# Patient Record
Sex: MALE | Race: BLACK OR AFRICAN AMERICAN | Employment: UNEMPLOYED | URBAN - METROPOLITAN AREA
[De-identification: names, ages, dates, MRNs, and addresses within clinical notes are randomized per-mention and may not be internally consistent; named-entity substitution may affect disease eponyms.]

---

## 2019-03-14 ENCOUNTER — HOSPITAL ENCOUNTER (INPATIENT)
Age: 24
LOS: 1 days | Discharge: LEFT BEFORE DISCHARGE | DRG: 881 | End: 2019-03-15
Attending: EMERGENCY MEDICINE | Admitting: PSYCHIATRY & NEUROLOGY
Payer: SELF-PAY

## 2019-03-14 DIAGNOSIS — R45.851 SUICIDAL INTENT: Primary | ICD-10-CM

## 2019-03-14 PROBLEM — F32.A DEPRESSION: Status: ACTIVE | Noted: 2019-03-14

## 2019-03-14 LAB
AMPHET UR QL SCN: NEGATIVE
ANION GAP SERPL CALC-SCNC: 10 MMOL/L (ref 5–15)
BARBITURATES UR QL SCN: NEGATIVE
BENZODIAZ UR QL: NEGATIVE
BUN SERPL-MCNC: 11 MG/DL (ref 6–20)
BUN/CREAT SERPL: 8 (ref 12–20)
CALCIUM SERPL-MCNC: 8.9 MG/DL (ref 8.5–10.1)
CANNABINOIDS UR QL SCN: NEGATIVE
CHLORIDE SERPL-SCNC: 104 MMOL/L (ref 97–108)
CO2 SERPL-SCNC: 28 MMOL/L (ref 21–32)
COCAINE UR QL SCN: NEGATIVE
CREAT SERPL-MCNC: 1.36 MG/DL (ref 0.7–1.3)
DRUG SCRN COMMENT,DRGCM: NORMAL
ETHANOL SERPL-MCNC: <10 MG/DL
GLUCOSE SERPL-MCNC: 104 MG/DL (ref 65–100)
METHADONE UR QL: NEGATIVE
OPIATES UR QL: NEGATIVE
PCP UR QL: NEGATIVE
POTASSIUM SERPL-SCNC: 3.1 MMOL/L (ref 3.5–5.1)
SODIUM SERPL-SCNC: 142 MMOL/L (ref 136–145)

## 2019-03-14 PROCEDURE — 74011250637 HC RX REV CODE- 250/637: Performed by: INTERNAL MEDICINE

## 2019-03-14 PROCEDURE — 80307 DRUG TEST PRSMV CHEM ANLYZR: CPT

## 2019-03-14 PROCEDURE — 36415 COLL VENOUS BLD VENIPUNCTURE: CPT

## 2019-03-14 PROCEDURE — 74011250637 HC RX REV CODE- 250/637: Performed by: PHYSICIAN ASSISTANT

## 2019-03-14 PROCEDURE — 99285 EMERGENCY DEPT VISIT HI MDM: CPT

## 2019-03-14 PROCEDURE — 65220000003 HC RM SEMIPRIVATE PSYCH

## 2019-03-14 PROCEDURE — 80048 BASIC METABOLIC PNL TOTAL CA: CPT

## 2019-03-14 RX ORDER — BENZTROPINE MESYLATE 2 MG/1
2 TABLET ORAL
Status: DISCONTINUED | OUTPATIENT
Start: 2019-03-14 | End: 2019-03-15 | Stop reason: HOSPADM

## 2019-03-14 RX ORDER — IBUPROFEN 400 MG/1
400 TABLET ORAL
Status: DISCONTINUED | OUTPATIENT
Start: 2019-03-14 | End: 2019-03-15 | Stop reason: HOSPADM

## 2019-03-14 RX ORDER — POTASSIUM CHLORIDE 750 MG/1
40 TABLET, FILM COATED, EXTENDED RELEASE ORAL
Status: COMPLETED | OUTPATIENT
Start: 2019-03-14 | End: 2019-03-14

## 2019-03-14 RX ORDER — LORAZEPAM 1 MG/1
1 TABLET ORAL
Status: DISCONTINUED | OUTPATIENT
Start: 2019-03-14 | End: 2019-03-15 | Stop reason: HOSPADM

## 2019-03-14 RX ORDER — ADHESIVE BANDAGE
30 BANDAGE TOPICAL DAILY PRN
Status: DISCONTINUED | OUTPATIENT
Start: 2019-03-14 | End: 2019-03-15 | Stop reason: HOSPADM

## 2019-03-14 RX ORDER — ZOLPIDEM TARTRATE 10 MG/1
10 TABLET ORAL
Status: DISCONTINUED | OUTPATIENT
Start: 2019-03-14 | End: 2019-03-15 | Stop reason: HOSPADM

## 2019-03-14 RX ORDER — OLANZAPINE 5 MG/1
5 TABLET ORAL
Status: DISCONTINUED | OUTPATIENT
Start: 2019-03-14 | End: 2019-03-15 | Stop reason: HOSPADM

## 2019-03-14 RX ORDER — LORAZEPAM 2 MG/ML
2 INJECTION INTRAMUSCULAR
Status: DISCONTINUED | OUTPATIENT
Start: 2019-03-14 | End: 2019-03-15 | Stop reason: HOSPADM

## 2019-03-14 RX ORDER — BENZTROPINE MESYLATE 1 MG/ML
2 INJECTION INTRAMUSCULAR; INTRAVENOUS
Status: DISCONTINUED | OUTPATIENT
Start: 2019-03-14 | End: 2019-03-15 | Stop reason: HOSPADM

## 2019-03-14 RX ORDER — IBUPROFEN 200 MG
1 TABLET ORAL
Status: DISCONTINUED | OUTPATIENT
Start: 2019-03-14 | End: 2019-03-15 | Stop reason: HOSPADM

## 2019-03-14 RX ORDER — ACETAMINOPHEN 325 MG/1
650 TABLET ORAL
Status: DISCONTINUED | OUTPATIENT
Start: 2019-03-14 | End: 2019-03-15 | Stop reason: HOSPADM

## 2019-03-14 RX ADMIN — POTASSIUM CHLORIDE 40 MEQ: 750 TABLET, EXTENDED RELEASE ORAL at 09:21

## 2019-03-14 RX ADMIN — POTASSIUM CHLORIDE 40 MEQ: 750 TABLET, FILM COATED, EXTENDED RELEASE ORAL at 21:54

## 2019-03-14 NOTE — H&P
History and Physical    Subjective:     Claude Solis. is a 26 y.o. male with no significnat past medical hx. Per ER documentation, pt has  PMHx significant for depression, presents via EMS to the hospital with cc of acute severe episode of SI x 1 day. Per EMS, pt reportedly called 911 this AM stating he was going to kill himself; no plan. Informed them where his body would be found. EMS searched house where call came from but did not find pt until another person called stating male was on bridge. Pt was soaked and appeared to have already jumped in the water once, attempting to drown himself. Pt was coaxed off the bridge and brought to ED. Pt endorses he has tried to kill himself before by jumping off a building but was not able to jump. PT states he will continue to attempt to kill himself until he is sucessful. Patient has not been taking his medications for 7 or 8 months. States he was admitted to [facility] in October for depression and a previous facility at an earlier time. Denies alcohol or substance abuse. Denies HI or hallucinations. No modifying factors. Pt denies any acute medical issues. Noted to have low potassium. Showing no signs of acute distress. Past Medical History:   Diagnosis Date    Depression       PSHx: none declared by pt. FHx: HTN    Social History     Tobacco Use    Smoking status: Never Smoker    Smokeless tobacco: Never Used   Substance Use Topics    Alcohol use: No       Prior to Admission medications    Not on File     No Known Allergies     Review of Systems:  Constitutional: negative  Eyes: negative  Ears, Nose, Mouth, Throat, and Face: negative  Respiratory: negative  Cardiovascular: negative  Gastrointestinal: negative  Genitourinary:negative  Integument/Breast: negative  Hematologic/Lymphatic: negative  Musculoskeletal:negative  Neurological: negative  Behavioral/Psychiatric: Depression.    Endocrine: negative  Allergic/Immunologic: negative     Objective: Intake and Output:    No intake/output data recorded. No intake/output data recorded. Physical Exam:   Visit Vitals  /66   Pulse 86   Temp 98.2 °F (36.8 °C)   Resp 16   Ht 6' 2\" (1.88 m)   Wt 113.4 kg (250 lb)   SpO2 100%   BMI 32.10 kg/m²     General:  Alert, cooperative, no distress, appears stated age. Head:  Normocephalic, without obvious abnormality, atraumatic. Eyes:  Conjunctivae/corneas clear. PERRL, EOMs intact. Ears:  Normal external ear canals both ears. Nose: Nares normal. Septum midline. Mucosa normal. No drainage or sinus tenderness. Throat: Lips, mucosa, and tongue normal. Teeth and gums normal.   Neck: Supple, symmetrical, trachea midline, no adenopathy, thyroid: no enlargement/tenderness/nodules. Back:   Symmetric, no curvature. ROM normal. No CVA tenderness. Lungs:   Clear to auscultation bilaterally. Chest wall:  No tenderness or deformity. Heart:  Regular rate and rhythm, S1, S2 normal, no murmur, click, rub or gallop. Abdomen:   Soft, non-tender. Bowel sounds normal. No masses,  No organomegaly. Extremities: Extremities normal, atraumatic, no cyanosis or edema. Pulses: Distal pulses intact. Skin: Skin color, texture, turgor normal. No rashes or lesions   Lymph nodes: No cervical or supraclavicular adenopathy. Neurologic: CNII-XII intact. Normal strength, sensation intact, reflexes 2/4 patellar region.         Data Review:   Recent Results (from the past 24 hour(s))   ETHYL ALCOHOL    Collection Time: 03/14/19  8:10 AM   Result Value Ref Range    ALCOHOL(ETHYL),SERUM <10 <70 MG/DL   METABOLIC PANEL, BASIC    Collection Time: 03/14/19  8:10 AM   Result Value Ref Range    Sodium 142 136 - 145 mmol/L    Potassium 3.1 (L) 3.5 - 5.1 mmol/L    Chloride 104 97 - 108 mmol/L    CO2 28 21 - 32 mmol/L    Anion gap 10 5 - 15 mmol/L    Glucose 104 (H) 65 - 100 mg/dL    BUN 11 6 - 20 MG/DL    Creatinine 1.36 (H) 0.70 - 1.30 MG/DL    BUN/Creatinine ratio 8 (L) 12 - 20 GFR est AA >60 >60 ml/min/1.73m2    GFR est non-AA >60 >60 ml/min/1.73m2    Calcium 8.9 8.5 - 10.1 MG/DL   DRUG SCREEN, URINE    Collection Time: 03/14/19 10:41 AM   Result Value Ref Range    AMPHETAMINES NEGATIVE  NEG      BARBITURATES NEGATIVE  NEG      BENZODIAZEPINES NEGATIVE  NEG      COCAINE NEGATIVE  NEG      METHADONE NEGATIVE  NEG      OPIATES NEGATIVE  NEG      PCP(PHENCYCLIDINE) NEGATIVE  NEG      THC (TH-CANNABINOL) NEGATIVE  NEG      Drug screen comment (NOTE)        Assessment:     Active Problems:    Depression (3/14/2019)    Hypokalemia    Elevated CR    Plan:     Supplement K    Recheck electrolytes in am    No VTE prophylaxis indicated or necessary at this time.    Signed By: Claudio Yeung MD     March 14, 2019

## 2019-03-14 NOTE — ED NOTES

## 2019-03-14 NOTE — BH NOTES
Pt was admitted to general psych unit in room 327. Pt was compliant with admission process. Pt admitted for SI attempt to jump off a bridge. Police arrived before the patient could attempt to jump again. Pt had multiple tattoos. Overall skin warm dry and intact. Pt unable to contract for safety. Pt placed on 1:1 constant observation. Patient denies HI, AVH, and pain. Patient alert and oriented x 4 and able to make needs known. Patient able to ambulate independently. Patient will be monitored for health and safety.

## 2019-03-14 NOTE — ED TRIAGE NOTES
Pt soaking wet-given dry paper clothes, towels, and warm blankets and asked to change. Unable to finish triage but no si/s of acute distress, able to stand and is axox3.

## 2019-03-14 NOTE — ED NOTES
Hospital security called for transportation to 3rd floor. No si/s of acute distress prior to transfer to [facility] psych.

## 2019-03-14 NOTE — ED NOTES
.. TRANSFER - OUT REPORT:    Verbal report given to satinder palmer rn(name) on [employer].  being transferred to 3rd floor, inpt psych unit(unit) for routine progression of care       Report consisted of patients Situation, Background, Assessment and   Recommendations(SBAR). Information from the following report(s) SBAR, Kardex, ED Summary, MAR, Recent Results and Med Rec Status was reviewed with the receiving nurse. Lines:       Opportunity for questions and clarification was provided.       Patient transported with:   pt's belongings, hospital security

## 2019-03-14 NOTE — ED NOTES
[id number] Naomi Durham for pt to eat/drink per [facility]. Pt given water. Breakfast tray ordered. Pt unable to yet void. Urinal at bedside.

## 2019-03-14 NOTE — ED NOTES
Pt also reported that he was admitted to inpt psych last fall and previous inpt admissions. Used to see counselor and be on zoloft. Denied recent life/home stressors except \"there's no point to live anymore. \" denied suicide plan in ED and denied HI.

## 2019-03-14 NOTE — ED TRIAGE NOTES
[place] [employer] and [employer] brought pt under ECO, soaking wet, after pt tried to drown himself in the river unsuccessfully PTA. [place], [place]. Reported previous episodes of SI.  Pt calm and cooperative on arrival.

## 2019-03-14 NOTE — ED NOTES
[facility] med board reported that pt had been admitted in this facility and to call report shortly. Angela Avelar, was notified. No si/s of acute distress. Call bell within reach.

## 2019-03-14 NOTE — ED NOTES
Princess Cartwright, crisis counselor, spoke with pt and stated that pt is now under voluntary status. Margaux Robert, was notified, of crisis's decision that pt is no longer under ECO. [place] Police left. No si/s of acute distress. Pt within line of sight of staff.

## 2019-03-15 VITALS
OXYGEN SATURATION: 99 % | BODY MASS INDEX: 32.08 KG/M2 | HEIGHT: 74 IN | RESPIRATION RATE: 16 BRPM | HEART RATE: 71 BPM | TEMPERATURE: 98.3 F | SYSTOLIC BLOOD PRESSURE: 123 MMHG | WEIGHT: 250 LBS | DIASTOLIC BLOOD PRESSURE: 65 MMHG

## 2019-03-15 LAB
CHOLEST SERPL-MCNC: 94 MG/DL
ERYTHROCYTE [DISTWIDTH] IN BLOOD BY AUTOMATED COUNT: 12.2 % (ref 11.5–14.5)
HCT VFR BLD AUTO: 40.2 % (ref 36.6–50.3)
HDLC SERPL-MCNC: 41 MG/DL
HDLC SERPL: 2.3 {RATIO} (ref 0–5)
HGB BLD-MCNC: 12.6 G/DL (ref 12.1–17)
LDLC SERPL CALC-MCNC: 39 MG/DL (ref 0–100)
LIPID PROFILE,FLP: NORMAL
MCH RBC QN AUTO: 29.8 PG (ref 26–34)
MCHC RBC AUTO-ENTMCNC: 31.3 G/DL (ref 30–36.5)
MCV RBC AUTO: 95 FL (ref 80–99)
NRBC # BLD: 0 K/UL (ref 0–0.01)
NRBC BLD-RTO: 0 PER 100 WBC
PLATELET # BLD AUTO: 246 K/UL (ref 150–400)
PMV BLD AUTO: 11.3 FL (ref 8.9–12.9)
RBC # BLD AUTO: 4.23 M/UL (ref 4.1–5.7)
TRIGL SERPL-MCNC: 70 MG/DL (ref ?–150)
VLDLC SERPL CALC-MCNC: 14 MG/DL
WBC # BLD AUTO: 4.9 K/UL (ref 4.1–11.1)

## 2019-03-15 PROCEDURE — 85027 COMPLETE CBC AUTOMATED: CPT

## 2019-03-15 PROCEDURE — 80061 LIPID PANEL: CPT

## 2019-03-15 PROCEDURE — 36415 COLL VENOUS BLD VENIPUNCTURE: CPT

## 2019-03-15 RX ORDER — SERTRALINE HYDROCHLORIDE 50 MG/1
50 TABLET, FILM COATED ORAL DAILY
Status: DISCONTINUED | OUTPATIENT
Start: 2019-03-15 | End: 2019-03-15 | Stop reason: HOSPADM

## 2019-03-15 NOTE — PROGRESS NOTES
Pt will be discharged AMA per [facility] elopement policy as he was a VOL PT.

## 2019-03-15 NOTE — BH NOTES
Behavioral Health Transition Record to Provider    Patient Name: Tenisha Krishna. YOB: 1995  Medical Record Number: [id number]  Date of Admission: 3/14/2019  Date of Discharge: 3/15/2019    Attending Provider: General Tank MD  Discharging Provider: DOMINICK Lnadry  To contact this individual call [phone] and ask the  to page. If unavailable, ask to be transferred to [address] [place] [address] Provider on call. [facility] Provider will be available on call 24/7 and during holidays. Primary Care Provider: Rohith Grant MD    No Known Allergies    Reason for Admission: Pt admitted on voluntary after ECO/prescreening. Pt attempted to to die by suicide by jumping off a bridge. Per report, pt called 911 saying he was going to kill self, had no plan and he advised location where they could find his body. Pt also sent a photographed suicide note via text to his father and brother. [employer] searched pt's home after call but no one present. Pt located on [facility], during the search [employer] reported to have received a telephone call reporting a male on bridge. Pt found by EMS as he was walking on an exterior pipe on the bridge with the intent of jumping off to drown himself but he was coaxed off. Pt reportedly was soaking wet and reported he had jumped into the [facility] once already but he could not follow through with the plan. Pt has history of SI and he made a previous attempt in the summer of 2018 when he climbed a transmission tower with intent to jump off and electrocute self. Pt reported to have dealt with untreated OCD since being a teenager in which he believes things are contaminated. As a result of the anxiety pt reportedly makes an attempt to touch nothing or have a barrier between him and the object. Pt isolates to his room at home so much he does not even use the bathroom in the house.  Pt reported to pre-screener he urinates and defecates in his bedroom but did not share on how he keeps from contaminating his space when doing this. Pt's stressors are losing employment, no stable housing, pt isolates and no support system (e.g. loss of friends and family not understanding disorder).             Admission Diagnosis: Depression [F32.9]    * No surgery found *    Results for orders placed or performed during the hospital encounter of 03/14/19   DRUG SCREEN, URINE   Result Value Ref Range    AMPHETAMINES NEGATIVE  NEG      BARBITURATES NEGATIVE  NEG      BENZODIAZEPINES NEGATIVE  NEG      COCAINE NEGATIVE  NEG      METHADONE NEGATIVE  NEG      OPIATES NEGATIVE  NEG      PCP(PHENCYCLIDINE) NEGATIVE  NEG      THC (TH-CANNABINOL) NEGATIVE  NEG      Drug screen comment (NOTE)    ETHYL ALCOHOL   Result Value Ref Range    ALCOHOL(ETHYL),SERUM <10 <06 MG/DL   METABOLIC PANEL, BASIC   Result Value Ref Range    Sodium 142 136 - 145 mmol/L    Potassium 3.1 (L) 3.5 - 5.1 mmol/L    Chloride 104 97 - 108 mmol/L    CO2 28 21 - 32 mmol/L    Anion gap 10 5 - 15 mmol/L    Glucose 104 (H) 65 - 100 mg/dL    BUN 11 6 - 20 MG/DL    Creatinine 1.36 (H) 0.70 - 1.30 MG/DL    BUN/Creatinine ratio 8 (L) 12 - 20      GFR est AA >60 >60 ml/min/1.73m2    GFR est non-AA >60 >60 ml/min/1.73m2    Calcium 8.9 8.5 - 10.1 MG/DL   CBC W/O DIFF   Result Value Ref Range    WBC 4.9 4.1 - 11.1 K/uL    RBC 4.23 4. 10 - 5.70 M/uL    HGB 12.6 12.1 - 17.0 g/dL    HCT 40.2 36.6 - 50.3 %    MCV 95.0 80.0 - 99.0 FL    MCH 29.8 26.0 - 34.0 PG    MCHC 31.3 30.0 - 36.5 g/dL    RDW 12.2 11.5 - 14.5 %    PLATELET 746 640 - 207 K/uL    MPV 11.3 8.9 - 12.9 FL    NRBC 0.0 0  WBC    ABSOLUTE NRBC 0.00 0.00 - 0.01 K/uL   LIPID PANEL   Result Value Ref Range    LIPID PROFILE          Cholesterol, total 94 <200 MG/DL    Triglyceride 70 <150 MG/DL    HDL Cholesterol 41 MG/DL    LDL, calculated 39 0 - 100 MG/DL    VLDL, calculated 14 MG/DL    CHOL/HDL Ratio 2.3 0.0 - 5.0         Immunizations administered during this encounter: There is no immunization history for the selected administration types on file for this patient. Screening for Metabolic Disorders for Patients on Antipsychotic Medications  (Data obtained from the EMR)    Estimated Body Mass Index  Estimated body mass index is 32.1 kg/m² as calculated from the following:    Height as of this encounter: 6' 2\" (1.88 m). Weight as of this encounter: 113.4 kg (250 lb). Vital Signs/Blood Pressure  Visit Vitals  /65   Pulse 71   Temp 98.3 °F (36.8 °C)   Resp 16   Ht 6' 2\" (1.88 m)   Wt 113.4 kg (250 lb)   SpO2 99%   BMI 32.10 kg/m²       Blood Glucose/Hemoglobin A1c  Lab Results   Component Value Date/Time    Glucose 104 (H) 03/14/2019 08:10 AM       No results found for: HBA1C, HGBE8, [id number]     Lipid Panel  Lab Results   Component Value Date/Time    Cholesterol, total 94 03/15/2019 04:40 AM    HDL Cholesterol 41 03/15/2019 04:40 AM    LDL, calculated 39 03/15/2019 04:40 AM    Triglyceride 70 03/15/2019 04:40 AM    CHOL/HDL Ratio 2.3 03/15/2019 04:40 AM        Discharge Diagnosis: Please refer to psychiatrist's note. Discharge Plan: Social Work     Pt was seen in treatment team this afternoon. Pt is alert and oriented. Pt's mood is anxious, affect is congruent to mood. Pt's thought process is goal oriented as he did not want to stay inpatient and stated over and over he would go to outpatient treatment. However, after two previous inpatient hospitalizations, pt has yet to follow through with outpatient referrals. Pt did agree to receive medication management over the weekend. Pt's father notified of plan and was supportive of pt. Pt's insight impaired and fair to poor and judgment is impaired and poor, reliability is poor. After treatment team, pt eloped from unit with security and family being notified. Pt's father advised to take pt to ED if located. Pt's hospital status AMA. Social work department will continue to coordinate discharge plans.      Discharge Medication List and Instructions: There are no discharge medications for this patient. Unresulted Labs (24h ago)    Start     Ordered    90/29/33 0608  METABOLIC PANEL, BASIC  ONE TIME,   R     Start Status   03/15/19 1300        03/15/19 1251    03/15/19 0600  TSH, 3RD GENERATION - DO NOT ORDER IF PATIENT HAS RECEIVED THIS LAB WITHIN THE LAST 8 WEEKS  ONE TIME,   R     Comments:  Do not repeat lab if already done in the ED prior to arrival on unit. Start Status   03/15/19 0600        03/14/19 1534    58/94/36 8715  METABOLIC PANEL, BASIC  TOMORROW AM,   R     Start Status   03/15/19 0400        03/14/19 2140        To obtain results of studies pending at discharge, please contact [phone]    Follow-up Information     Follow up With Specialties Details Why Contact Info    [address], Davian Patrick MD Pediatrics   [place] [address]  [phone]            Advanced Directive:   Does the patient have an appointed surrogate decision maker? Yes  Does the patient have a Medical Advance Directive? No  Does the patient have a Psychiatric Advance Directive? No  If the patient does not have a surrogate or Medical Advance Directive AND Psychiatric Advance Directive, the patient was offered information on these advance directives Patient will complete at a later time    Patient Instructions: Please continue all medications until otherwise directed by physician. N/A    Tobacco Cessation Discharge Plan:   Is the patient a smoker and needs referral for smoking cessation? Not applicable  Patient referred to the following for smoking cessation with an appointment? Not applicable     Patient was offered medication to assist with smoking cessation at discharge? Not applicable  Was education for smoking cessation added to the discharge instructions?  Not applicable    Alcohol/Substance Abuse Discharge Plan:   Does the patient have a history of substance/alcohol abuse and requires a referral for treatment? Not applicable  Patient referred to the following for substance/alcohol abuse treatment with an appointment? Not applicable  Patient was offered medication to assist with alcohol cessation at discharge? Not applicable  Was education for substance/alcohol abuse added to discharge instructions? Not applicable    Patient discharged to Home; provided to the patient/caregiver either in hard copy or electronically.  *pt left AMBER Hartmann, Resident In Counseling
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
## 2019-03-15 NOTE — BH NOTES
Social Work      Patient did not attend social work process group.           MARSHAL Weeks, Supervisee in Social Work
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
## 2019-03-15 NOTE — PROGRESS NOTES
Problem: Suicide/Homicide (Adult/Pediatric)  Goal: *STG: Remains safe in hospital  Outcome: Progressing Towards Goal    1900: Pt received by me resting in bed with eyes closed, RR even and unlabored. Pt remains on one to one observation. No behavioral concern in pt at this time. Will continue to monitor pt for safety and health    2100: Pt observed in bed with eyes closed. No suicidal behavior noticed in pt. Suicidal precautions still ongoing at this time. 2300: Pt is alert and oriented, awake in room and refused dinner or snacks even after education and encouragement. Pt only took fluids Apple juice and water) Pt's vital signs are stable and within normal limits. Pt remains on one to one observation. No suicidal behaviors observed in pt. Will continue to monitor pt for health and safety. 0100: Pt remains on one to one. No suicidal behavior observed in pt. Suicidal precautions still in place. 0300: No suicidal behavior observed in pt. Will continue to monitor pt for health and safety. 0500: Pt observed in the dayroom watching TV. No suicidal behavior observed in pt. Remains on one to one observation. 0700: Pt observed in bed resting with eyes closed, RR even and unlabored with no acute distress. There has been no medical/behavioral concern observed in pt during the shift. Pt remains on one to one observation. Pt is calm, withdrawn to self, guarded , still endorses depression while denying SI/HI at this time of report.     Pt slept for 2.5 hours
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
## 2019-03-15 NOTE — H&P
BRIEF PSYCHIATRIC DISCHARGE NOTE         IDENTIFICATION:    Patient Name  Viktoria Pritchard. Date of Birth 1995   [facility] [id number]   Medical Record Number  [id number]      Age  26 y.o. PCP Alley Dumont MD   Admit date:  3/14/2019    Discharge date: 3/15/2019   Room Number  319/01  @ [facility]   Date of Service  3/15/2019           [address] from unit today. TYPE OF DISCHARGE: AMA                CONDITION AT DISCHARGE: Anxious, sad when last seen by this writer. PROVISIONAL & DISCHARGE DIAGNOSES:    Problem List  Never Reviewed          Codes Class    Depression ICD-10-CM: F32.9  ICD-9-CM: [address] Problems    Depression        DISCHARGE DIAGNOSIS:   Axis I:  SEE ABOVE  Axis II: SEE ABOVE  Axis III: SEE ABOVE  Axis IV:  lack of structure  Axis V:           CC & HISTORY OF PRESENT ILLNESS:  See H&P Psych Eval       DISCHARGE MEDICATIONS: (no changes made). Yesenia Pérez. was seen on rounds and case discussed with staff. I have spent greater than 35 minutes on discharge work.     Signed:  Ricci Dominguez NP  3/15/2019

## 2019-03-15 NOTE — BH NOTES
SOCIAL WORK    Pt eloped from unit exiting the unit's back door. Security notified. Pt's father immediately notified and advised to bring pt back to ED or call police if he was located as pt is high risk as a danger to self due to being suicidal with intent and plan. Pt's father called writer back and asked if police were looking for pt and writer explained no because pt was voluntary status. Per Saurabh Barrios shared through [facility], writer advised Mr. Carline Robertson, I could call PD and request a well check visit to pt's home or he could do that. Father stated he was sitting at pt's home and he was not there while pt's brother was out riding around looking for pt. Pt's father asked what pt had on in which writer shared a [employer] rodrigo, black and white patterned sweat pants and hospital socks. Again, writer advised father to take pt to ED if located.     Kaleigh Lizarraga, Resident In Counseling

## 2022-03-07 ENCOUNTER — DOCUMENTATION ONLY (OUTPATIENT)
Dept: FAMILY MEDICINE CLINIC | Age: 27
End: 2022-03-07

## 2022-03-07 ENCOUNTER — VIRTUAL VISIT (OUTPATIENT)
Dept: FAMILY MEDICINE CLINIC | Age: 27
End: 2022-03-07
Payer: MEDICAID

## 2022-03-07 DIAGNOSIS — M79.604 PAIN IN BOTH LOWER EXTREMITIES: ICD-10-CM

## 2022-03-07 DIAGNOSIS — R73.03 PREDIABETES: Primary | ICD-10-CM

## 2022-03-07 DIAGNOSIS — R35.89 POLYURIA: ICD-10-CM

## 2022-03-07 DIAGNOSIS — M79.605 PAIN IN BOTH LOWER EXTREMITIES: ICD-10-CM

## 2022-03-07 DIAGNOSIS — R03.0 ELEVATED BP WITHOUT DIAGNOSIS OF HYPERTENSION: ICD-10-CM

## 2022-03-07 PROBLEM — H04.123 DRY EYES, BILATERAL: Status: ACTIVE | Noted: 2022-03-07

## 2022-03-07 PROCEDURE — 99204 OFFICE O/P NEW MOD 45 MIN: CPT | Performed by: STUDENT IN AN ORGANIZED HEALTH CARE EDUCATION/TRAINING PROGRAM

## 2022-03-07 NOTE — ASSESSMENT & PLAN NOTE
Follow-up in office for bp check. Reviewed that the best way to prevent kidney damage is good control of blood pressure and blood sugars.

## 2022-03-07 NOTE — PROGRESS NOTES
Progress Note    he is a 23y.o. year old male who presents for evalution. Assessment/ Plan:   Diagnoses and all orders for this visit:    1. Prediabetes  Assessment & Plan:  Last labs 2019, values unknown. Reassess with labs    Orders:  -     METABOLIC PANEL, COMPREHENSIVE; Future  -     HEMOGLOBIN A1C WITH EAG; Future  -     LIPID PANEL; Future  -     MICROALBUMIN, UR, RAND W/ MICROALB/CREAT RATIO; Future    2. Elevated BP without diagnosis of hypertension  Assessment & Plan:  Follow-up in office for bp check. Reviewed that the best way to prevent kidney damage is good control of blood pressure and blood sugars. Orders:  -     TSH 3RD GENERATION; Future  -     CBC WITH AUTOMATED DIFF; Future  -     T4, FREE; Future    3. Polyuria  -     URINALYSIS W/ RFLX MICROSCOPIC; Future    4. Pain in both lower extremities  Assessment & Plan:  History with some concern for claudication and possibly Raynaud's  Labs to assess for cardiovascular risk       Follow-up and Dispositions    · Return in about 1 month (around 4/7/2022) for follow-up in office within the month (2-4 weeks). I have discussed the diagnosis with the patient and the intended plan as seen in the above orders. The patient has received an after-visit summary and questions were answered concerning future plans. Pt conveyed understanding of plan. Medication Side Effects and Warnings were discussed with patient      Kathy Zepeda MD       Subjective:     Chief Complaint   Patient presents with    New Patient     No previous PCP. Health has been not good for awhile  Feels like he has had \"crazy symptoms\" going on    Concerned about diabetes   2019 was told he was prediabetic by a test at work. No bloodwork since then. Some family history of diabetes  Having tingling in hands and feet   When it is cold outside   Off and on for 3 years   When he wakes the symptoms are there and go away with movement for 5 minutes.   With activity, will have burning pain in legs and feet. Reports polyuria with dark, odorous urine even with being well hydrated. Worried about his kidneys  Dry eyes   Wants to get them checked  Reports history of high blood pressure, no current medication or home monitoring. Reviewed PmHx, RxHx, FmHx, SocHx, AllgHx and updated and dated in the chart. Review of Systems - negative except as listed above in the HPI      Objective: There were no vitals filed for this visit. No current outpatient medications on file. No current facility-administered medications for this visit. Physical Exam  Vitals and nursing note reviewed. Constitutional:       General: He is not in acute distress. Appearance: Normal appearance. He is not ill-appearing, toxic-appearing or diaphoretic. HENT:      Head: Normocephalic and atraumatic. Eyes:      General: No scleral icterus. Right eye: No discharge. Left eye: No discharge. Conjunctiva/sclera: Conjunctivae normal.   Pulmonary:      Effort: Pulmonary effort is normal. No respiratory distress. Musculoskeletal:      Cervical back: No rigidity. Neurological:      Mental Status: He is alert. Psychiatric:         Mood and Affect: Mood normal.         Behavior: Behavior normal.         Thought Content: Thought content normal.         Judgment: Judgment normal.               I was in the office while conducting this encounter. Total Time: minutes: 11-20 minutes. Pooja Loco is a 23 y.o. male being evaluated by a Virtual Visit (video visit) encounter to address concerns as mentioned above. A caregiver was present when appropriate. Due to this being a TeleHealth encounter (During [address] emergency), evaluation of the following organ systems was limited: Vitals/Constitutional/EENT/Resp/CV/GI//MS/Neuro/Skin/Heme-Lymph-Imm.   Pursuant to the emergency declaration under the [facility] Act, 1135 waiver authority and the Coronavirus Preparedness and Response Supplemental Appropriations Act, this Virtual Visit was conducted with patient's (and/or legal guardian's) consent, to reduce the risk of exposure to COVID-19 and provide necessary medical care. Services were provided through a video synchronous discussion virtually to substitute for in-person encounter. --Nissa Cheng MD on 3/7/2022 at 8:19 AM    An electronic signature was used to authenticate this note.

## 2022-03-07 NOTE — ASSESSMENT & PLAN NOTE
History with some concern for claudication and possibly Raynaud's  Labs to assess for cardiovascular risk

## 2022-03-07 NOTE — PATIENT INSTRUCTIONS
7 Tips for How to [place] When Things Feel Out of Control  When life feels chaotic or overwhelming, it can be easy to get stuck in a cycle of stress and worry. But there are things you can do to cope with the chaos and find some calm. Here are some tips. Be aware of your feelings. Try to note what you're feeling when you're feeling it, without judging it as \"good\" or \"bad. \" It might help to write down how you're feeling and why. Notice your mindset. The way you think about things really does affect the way you feel. If you tell yourself something is too hard or too stressful, it's going to feel that way. But if you tell yourself you can handle something hard, you're more likely to be able to. Focus on what you can control. Try not to focus on what you can't. Make a list of the things that cause you stress. Then decide which things on the list you can take action on and which you can't. This can remind you what's in your control and what isn't. Spend time doing things that are meaningful to you. For example, you could do projects with your kids, foster an animal, write postcards to friends, or do random acts of kindness for your neighbors. Do things that make you feel good or bring you alban. Look for sources of stress you can limit. Then take steps to limit them. That might mean turning off the news, staying away from social media, or even having less contact with certain people. Distract yourself. Spend time on a hobby or project. Call a friend. Start watching a new TV series. Whatever you decide, make sure it makes you happy and feels worth your time. Be sure your coping strategies are helpful. Find things to do that help calm and relax you. Reading for fun, taking a bath, or spending some time being mindful are the types of things that are more likely to reduce stress than add to it. But things like staying too busy might exhaust you or add stress.  A glass of wine or a beer in the evening may not be a bad thing for some people, but alcohol can make stress and anxiety worse. Just make sure that the things you're doing to cope are helping rather than hurting. Current as of: June 16, 2021               Content Version: 13.0  © 5924-5250 [employer]. Care instructions adapted under license by [employer] (which disclaims liability or warranty for this information). If you have questions about a medical condition or this instruction, always ask your healthcare professional. [place] [address] any warranty or liability for your use of this information.

## 2022-03-07 NOTE — PROGRESS NOTES
Cherelle Logan. is a 23 y.o. male , id x 2(name and ). Reviewed questionnaires, and  medications. Chief Complaint   Patient presents with    New Patient       No flowsheet data found.

## 2022-03-18 PROBLEM — M79.604 PAIN IN BOTH LOWER EXTREMITIES: Status: ACTIVE | Noted: 2022-03-07

## 2022-03-18 PROBLEM — R03.0 ELEVATED BP WITHOUT DIAGNOSIS OF HYPERTENSION: Status: ACTIVE | Noted: 2022-03-07

## 2022-03-18 PROBLEM — M79.605 PAIN IN BOTH LOWER EXTREMITIES: Status: ACTIVE | Noted: 2022-03-07

## 2022-03-19 PROBLEM — F32.A DEPRESSION: Status: ACTIVE | Noted: 2019-03-14

## 2022-03-19 PROBLEM — R35.89 POLYURIA: Status: ACTIVE | Noted: 2022-03-07

## 2022-03-20 PROBLEM — R73.03 PREDIABETES: Status: ACTIVE | Noted: 2022-03-07

## 2022-03-20 PROBLEM — H04.123 DRY EYES, BILATERAL: Status: ACTIVE | Noted: 2022-03-07

## 2024-01-26 ENCOUNTER — OFFICE VISIT (OUTPATIENT)
Age: 29
End: 2024-01-26

## 2024-01-26 VITALS
OXYGEN SATURATION: 97 % | SYSTOLIC BLOOD PRESSURE: 122 MMHG | TEMPERATURE: 98.4 F | HEIGHT: 74 IN | WEIGHT: 277.4 LBS | DIASTOLIC BLOOD PRESSURE: 78 MMHG | BODY MASS INDEX: 35.6 KG/M2 | HEART RATE: 91 BPM

## 2024-01-26 DIAGNOSIS — Z00.00 VISIT FOR WELL MAN HEALTH CHECK: ICD-10-CM

## 2024-01-26 DIAGNOSIS — Z13.220 SCREENING CHOLESTEROL LEVEL: ICD-10-CM

## 2024-01-26 DIAGNOSIS — R73.03 PREDIABETES: ICD-10-CM

## 2024-01-26 DIAGNOSIS — F42.9 OBSESSIVE-COMPULSIVE DISORDER, UNSPECIFIED TYPE: ICD-10-CM

## 2024-01-26 DIAGNOSIS — Z76.89 ESTABLISHING CARE WITH NEW DOCTOR, ENCOUNTER FOR: Primary | ICD-10-CM

## 2024-01-26 DIAGNOSIS — Z13.29 SCREENING FOR THYROID DISORDER: ICD-10-CM

## 2024-01-26 DIAGNOSIS — Z11.59 ENCOUNTER FOR HEPATITIS C SCREENING TEST FOR LOW RISK PATIENT: ICD-10-CM

## 2024-01-26 DIAGNOSIS — Z23 ENCOUNTER FOR IMMUNIZATION: ICD-10-CM

## 2024-01-26 DIAGNOSIS — F41.1 GENERALIZED ANXIETY DISORDER: ICD-10-CM

## 2024-01-26 DIAGNOSIS — R35.89 POLYURIA: ICD-10-CM

## 2024-01-26 DIAGNOSIS — H04.123 DRY EYES, BILATERAL: ICD-10-CM

## 2024-01-26 DIAGNOSIS — F33.0 MILD EPISODE OF RECURRENT MAJOR DEPRESSIVE DISORDER (HCC): ICD-10-CM

## 2024-01-26 DIAGNOSIS — E55.9 VITAMIN D DEFICIENCY: ICD-10-CM

## 2024-01-26 DIAGNOSIS — E66.9 OBESITY (BMI 35.0-39.9 WITHOUT COMORBIDITY): ICD-10-CM

## 2024-01-26 PROBLEM — R03.0 ELEVATED BP WITHOUT DIAGNOSIS OF HYPERTENSION: Status: RESOLVED | Noted: 2022-03-07 | Resolved: 2024-01-26

## 2024-01-26 PROBLEM — M79.605 PAIN IN BOTH LOWER EXTREMITIES: Status: RESOLVED | Noted: 2022-03-07 | Resolved: 2024-01-26

## 2024-01-26 PROBLEM — M79.604 PAIN IN BOTH LOWER EXTREMITIES: Status: RESOLVED | Noted: 2022-03-07 | Resolved: 2024-01-26

## 2024-01-26 PROCEDURE — 90674 CCIIV4 VAC NO PRSV 0.5 ML IM: CPT | Performed by: STUDENT IN AN ORGANIZED HEALTH CARE EDUCATION/TRAINING PROGRAM

## 2024-01-26 PROCEDURE — 99385 PREV VISIT NEW AGE 18-39: CPT | Performed by: STUDENT IN AN ORGANIZED HEALTH CARE EDUCATION/TRAINING PROGRAM

## 2024-01-26 PROCEDURE — 99204 OFFICE O/P NEW MOD 45 MIN: CPT | Performed by: STUDENT IN AN ORGANIZED HEALTH CARE EDUCATION/TRAINING PROGRAM

## 2024-01-26 SDOH — ECONOMIC STABILITY: INCOME INSECURITY: HOW HARD IS IT FOR YOU TO PAY FOR THE VERY BASICS LIKE FOOD, HOUSING, MEDICAL CARE, AND HEATING?: NOT HARD AT ALL

## 2024-01-26 SDOH — ECONOMIC STABILITY: HOUSING INSECURITY
IN THE LAST 12 MONTHS, WAS THERE A TIME WHEN YOU DID NOT HAVE A STEADY PLACE TO SLEEP OR SLEPT IN A SHELTER (INCLUDING NOW)?: NO

## 2024-01-26 SDOH — ECONOMIC STABILITY: FOOD INSECURITY: WITHIN THE PAST 12 MONTHS, YOU WORRIED THAT YOUR FOOD WOULD RUN OUT BEFORE YOU GOT MONEY TO BUY MORE.: NEVER TRUE

## 2024-01-26 SDOH — ECONOMIC STABILITY: FOOD INSECURITY: WITHIN THE PAST 12 MONTHS, THE FOOD YOU BOUGHT JUST DIDN'T LAST AND YOU DIDN'T HAVE MONEY TO GET MORE.: NEVER TRUE

## 2024-01-26 ASSESSMENT — PATIENT HEALTH QUESTIONNAIRE - PHQ9
SUM OF ALL RESPONSES TO PHQ QUESTIONS 1-9: 0
1. LITTLE INTEREST OR PLEASURE IN DOING THINGS: 0
SUM OF ALL RESPONSES TO PHQ QUESTIONS 1-9: 0
SUM OF ALL RESPONSES TO PHQ9 QUESTIONS 1 & 2: 0
SUM OF ALL RESPONSES TO PHQ QUESTIONS 1-9: 0
2. FEELING DOWN, DEPRESSED OR HOPELESS: 0
SUM OF ALL RESPONSES TO PHQ QUESTIONS 1-9: 0

## 2024-01-26 ASSESSMENT — ENCOUNTER SYMPTOMS
CONSTIPATION: 0
TROUBLE SWALLOWING: 0
NAUSEA: 0
SHORTNESS OF BREATH: 0
VOMITING: 0
BLOOD IN STOOL: 0
DIARRHEA: 0
COUGH: 0
ABDOMINAL PAIN: 0

## 2024-01-26 NOTE — PROGRESS NOTES
RM14    Chief Complaint   Patient presents with    New Patient     Pt denied any questions or concern today.       Vitals:    01/26/24 1039   BP: 122/78   Site: Right Upper Arm   Position: Sitting   Pulse: 91   Temp: 98.4 °F (36.9 °C)   TempSrc: Oral   SpO2: 97%   Weight: 125.8 kg (277 lb 6.4 oz)   Height: 1.88 m (6' 2\")       \"Have you been to the ER, urgent care clinic since your last visit?  Hospitalized since your last visit?\"    NO    “Have you seen or consulted any other health care providers outside of [facility] since your last visit?”    NO    Health Maintenance Due   Topic Date Due    Hepatitis B vaccine (1 of 3 - 3-dose series) Never done    COVID-19 Vaccine (1) Never done    Varicella vaccine (1 of 2 - 2-dose childhood series) Never done    A1C test (Diabetic or Prediabetic)  Never done    HIV screen  Never done    Hepatitis C screen  Never done    DTaP/Tdap/Td vaccine (1 - Tdap) Never done    Depression Monitoring  03/07/2023    Flu vaccine (1) Never done       AVS  education, follow up, and recommendations provided and addressed with patient.  services used to advise patient No  
   COVID-19 Vaccine (1) Never done    Varicella vaccine (1 of 2 - 2-dose childhood series) Never done    A1C test (Diabetic or Prediabetic)  Never done    Hepatitis C screen  Never done    DTaP/Tdap/Td vaccine (1 - Tdap) Never done    Depression Monitoring  03/07/2023          Past Medical History:   Diagnosis Date    Depression     Obsessive compulsive disorder     OCD (obsessive compulsive disorder)      History reviewed. No pertinent surgical history.   Family History   Problem Relation Age of Onset    Hypertension Mother     No Known Problems Father     No Known Problems Brother     No Known Problems Brother     Diabetes Maternal Grandmother     Diabetes Maternal Aunt      Social History     Socioeconomic History    Marital status: Single     Spouse name: Not on file    Number of children: Not on file    Years of education: Not on file    Highest education level: Not on file   Occupational History    Not on file   Tobacco Use    Smoking status: Never    Smokeless tobacco: Never   Substance and Sexual Activity    Alcohol use: No    Drug use: No    Sexual activity: Not on file   Other Topics Concern    Not on file   Social History Narrative    Not on file     Social Determinants of Health     Financial Resource Strain: Low Risk  (1/26/2024)    Overall Financial Resource Strain (CARDIA)     Difficulty of Paying Living Expenses: Not hard at all   Food Insecurity: No Food Insecurity (1/26/2024)    Hunger Vital Sign     Worried About Running Out of Food in the Last Year: Never true     Ran Out of Food in the Last Year: Never true   Transportation Needs: Unknown (1/26/2024)    PRAPARE - Transportation     Lack of Transportation (Medical): Not on file     Lack of Transportation (Non-Medical): No   Physical Activity: Not on file   Stress: Not on file   Social Connections: Not on file   Intimate Partner Violence: Not on file   Housing Stability: Unknown (1/26/2024)    Housing Stability Vital Sign     Unable to Pay for

## 2024-01-27 LAB
25(OH)D3 SERPL-MCNC: 12.9 NG/ML (ref 30–100)
ALBUMIN SERPL-MCNC: 4.8 G/DL (ref 3.5–5)
ALBUMIN/GLOB SERPL: 1.5 (ref 1.1–2.2)
ALP SERPL-CCNC: 58 U/L (ref 45–117)
ALT SERPL-CCNC: 43 U/L (ref 12–78)
ANION GAP SERPL CALC-SCNC: 6 MMOL/L (ref 5–15)
AST SERPL-CCNC: 16 U/L (ref 15–37)
BASOPHILS # BLD: 0 K/UL (ref 0–0.1)
BASOPHILS NFR BLD: 1 % (ref 0–1)
BILIRUB SERPL-MCNC: 0.8 MG/DL (ref 0.2–1)
BUN SERPL-MCNC: 15 MG/DL (ref 6–20)
BUN/CREAT SERPL: 13 (ref 12–20)
CALCIUM SERPL-MCNC: 9.6 MG/DL (ref 8.5–10.1)
CHLORIDE SERPL-SCNC: 106 MMOL/L (ref 97–108)
CHOLEST SERPL-MCNC: 138 MG/DL
CO2 SERPL-SCNC: 26 MMOL/L (ref 21–32)
CREAT SERPL-MCNC: 1.16 MG/DL (ref 0.7–1.3)
CREAT UR-MCNC: 267 MG/DL
DIFFERENTIAL METHOD BLD: ABNORMAL
EOSINOPHIL # BLD: 0 K/UL (ref 0–0.4)
EOSINOPHIL NFR BLD: 1 % (ref 0–7)
ERYTHROCYTE [DISTWIDTH] IN BLOOD BY AUTOMATED COUNT: 12.4 % (ref 11.5–14.5)
EST. AVERAGE GLUCOSE BLD GHB EST-MCNC: 94 MG/DL
GLOBULIN SER CALC-MCNC: 3.3 G/DL (ref 2–4)
GLUCOSE SERPL-MCNC: 96 MG/DL (ref 65–100)
HBA1C MFR BLD: 4.9 % (ref 4–5.6)
HCT VFR BLD AUTO: 46.2 % (ref 36.6–50.3)
HDLC SERPL-MCNC: 46 MG/DL
HDLC SERPL: 3 (ref 0–5)
HGB BLD-MCNC: 13.8 G/DL (ref 12.1–17)
IMM GRANULOCYTES # BLD AUTO: 0 K/UL (ref 0–0.04)
IMM GRANULOCYTES NFR BLD AUTO: 0 % (ref 0–0.5)
LDLC SERPL CALC-MCNC: 78.2 MG/DL (ref 0–100)
LYMPHOCYTES # BLD: 2.5 K/UL (ref 0.8–3.5)
LYMPHOCYTES NFR BLD: 36 % (ref 12–49)
MCH RBC QN AUTO: 30.5 PG (ref 26–34)
MCHC RBC AUTO-ENTMCNC: 29.9 G/DL (ref 30–36.5)
MCV RBC AUTO: 102 FL (ref 80–99)
MICROALBUMIN UR-MCNC: 2.43 MG/DL
MICROALBUMIN/CREAT UR-RTO: 9 MG/G (ref 0–30)
MONOCYTES # BLD: 0.9 K/UL (ref 0–1)
MONOCYTES NFR BLD: 12 % (ref 5–13)
NEUTS SEG # BLD: 3.5 K/UL (ref 1.8–8)
NEUTS SEG NFR BLD: 50 % (ref 32–75)
NRBC # BLD: 0 K/UL (ref 0–0.01)
NRBC BLD-RTO: 0 PER 100 WBC
PLATELET # BLD AUTO: 279 K/UL (ref 150–400)
PMV BLD AUTO: 11.6 FL (ref 8.9–12.9)
POTASSIUM SERPL-SCNC: 4.3 MMOL/L (ref 3.5–5.1)
PROT SERPL-MCNC: 8.1 G/DL (ref 6.4–8.2)
RBC # BLD AUTO: 4.53 M/UL (ref 4.1–5.7)
SODIUM SERPL-SCNC: 138 MMOL/L (ref 136–145)
TRIGL SERPL-MCNC: 69 MG/DL
TSH SERPL DL<=0.05 MIU/L-ACNC: 1.97 UIU/ML (ref 0.36–3.74)
VLDLC SERPL CALC-MCNC: 13.8 MG/DL
WBC # BLD AUTO: 6.9 K/UL (ref 4.1–11.1)

## 2024-01-28 LAB
HCV AB SERPL QL IA: NORMAL
HCV IGG SERPL QL IA: NON REACTIVE S/CO RATIO

## 2024-01-29 ENCOUNTER — TELEPHONE (OUTPATIENT)
Age: 29
End: 2024-01-29

## 2024-01-29 DIAGNOSIS — E55.9 VITAMIN D DEFICIENCY: Primary | ICD-10-CM

## 2024-01-29 RX ORDER — ERGOCALCIFEROL 1.25 MG/1
50000 CAPSULE ORAL WEEKLY
Qty: 4 CAPSULE | Refills: 5 | Status: SHIPPED | OUTPATIENT
Start: 2024-01-29